# Patient Record
Sex: MALE | Race: WHITE
[De-identification: names, ages, dates, MRNs, and addresses within clinical notes are randomized per-mention and may not be internally consistent; named-entity substitution may affect disease eponyms.]

---

## 2018-10-05 ENCOUNTER — HOSPITAL ENCOUNTER (OUTPATIENT)
Dept: HOSPITAL 62 - OROUT | Age: 28
Discharge: HOME | End: 2018-10-05
Attending: ORTHOPAEDIC SURGERY
Payer: OTHER GOVERNMENT

## 2018-10-05 VITALS — DIASTOLIC BLOOD PRESSURE: 75 MMHG | SYSTOLIC BLOOD PRESSURE: 118 MMHG
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
## 2018-10-05 DIAGNOSIS — S64.497A: ICD-10-CM

## 2018-10-05 DIAGNOSIS — S61.217A: ICD-10-CM

## 2018-10-05 DIAGNOSIS — S66.126A: Primary | ICD-10-CM

## 2018-10-05 DIAGNOSIS — M79.645: ICD-10-CM
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
## 2018-10-05 PROCEDURE — 26356 REPAIR FINGER/HAND TENDON: CPT

## 2018-10-05 PROCEDURE — C9250 ARTISS FIBRIN SEALANT: HCPCS

## 2018-10-05 PROCEDURE — 64831 REPAIR OF DIGIT NERVE: CPT

## 2018-10-05 PROCEDURE — C1769 GUIDE WIRE: HCPCS

## 2018-10-05 PROCEDURE — C1713 ANCHOR/SCREW BN/BN,TIS/BN: HCPCS

## 2018-10-05 NOTE — OPERATIVE REPORT
Operative Report


DATE OF SURGERY: 10/05/18


PREOPERATIVE DIAGNOSIS: Left small finger zone II flexor tendon laceration, 

ulnar digital nerve laceration


POSTOPERATIVE DIAGNOSIS: Left small finger zone I/II flexor tendon laceration, 

ulnar digital nerve laceration, skin loss along the PIP joint


OPERATION: 1.  Zone I/II FDP repair left small finger.  2.  Ulnar digital nerve 

repair left small finger.  3.  Complex closure less than 3 cm with skin 

advancement


SURGEON: YAMIL HENDRICKSON


ANESTHESIA: LMAC


COMPLICATIONS: 





None


ESTIMATED BLOOD LOSS: Minimal


PROCEDURE: 





Indication for above procedure:





28-year-old male who sustained a injury to his left small finger while he was 

evacuated.  Patient was seen outside emergency room where the area was 

irrigated and loosely closed.  He was then sent to me for further evaluation 

and treatment.  Patient findings on examination of digital nerve injury along 

with flexor tendon injury.  Postoperative expectations, prognosis and 

rehabilitation explained to the patient after discussing risks and benefits of 

the surgical procedure patient verbalized understanding consented for the 

procedure.





Procedure In Detail:





Patient was seen and evaluated in the preoperative holding area.  The LEFT 

upper extremity was initialized and marked.  In the preoperative holding area 

the left upper extremity at the fifth digit was prepped with alcohol and a 

digital block was performed injecting 1% lidocaine with epinephrine 1: 100,000 

patient tolerated procedure well.  Patient received 2g of Ancef IV for 

bacterial prophylaxis.  Patient was taken back to the operative room where 

transferred to the operative table and placed under general anesthesia.  Once 

they were adequately anesthetized a nonsterile tourniquet was placed on the 

upper extremity.  A surgical team debriefing was performed ensuring all 

instrumentation was available, the surgical procedure was discussed with 

possible concerns reviewed.  Additional injection was performed to the MP, PIP 

and DIP joints with 1% lidocaine with epinephrine 1: 100,002 cc at the PIP and 

MP joints and 1 cc at the DIP joint.  The upper extremity was prepped with 

Betadine and draped in a sterile fashion.   A timeout was done identifying 

correct patient, procedure and extremity everyone in attendance agree with this 

and verbalized no concerns. 





Brunner's skin incision was made beginning at the A1 pulley extending along the 

DIP joint.  There was significant necrosis of the skin along the proximal 

phalanx leaving the area of skin defect with foul odor and a small amount of 

purulent drainage.  Blunt dissection was performed.  The area was copiously 

irrigated with normal saline.  Exploration of the digit demonstrated intact 

radial neurovascular bundle but disruption of the ulnar digital nerve with a 30 

mm defect.  There is also laceration of the FDS and FDP with scarring of the 

distal aspect of the A2 pulley and complete loss of the A4 pulley in the volar 

plate along the DIP joint.  The laceration of the tendon was in a oblique 

manner resulting in deficient tendon within zone I.





The proximal aspect of the FDP was fed through the pulley sheaths avoiding 

manipulation of the distal aspect to optimize healing.  It was brought out to 

the level consistent with the transition point between zone I and II.  The 

tendon was reapproximated with a M-Sanchez repair technique utilizing 4-0 fiber 

loop suture.  It was then reinforced with a second core suture utilizing 4-0 

fiber loop completing a 6 strand suture repair.  During placement the core 

sutures the tendon was marked at 1 cm to ensure adequate fixation.  Patient was 

awoken from anesthesia and was able to make a full composite fist and full 

extension of the digit without evidence of gapping.  The repair site was then 

reapproximated with 6-0 Prolene epitendinous suture.  Despite the fact there 

was no evidence of gapping there was poor definition tissue within zone 1 thus 

I placed a running Krakw suture proximally and distally this was fed through 

the distal aspect of the nail plate and tied over felt ensuring optimal tension 

to avoid over tensioning the flexor tendon.  This essentially acted as a 

internal brace given the deficient tissue within the distal aspect of the FDP 

but avoid over tension of the flexor tendon.  The wound was then copiously 

irrigated with normal saline.





Under microscope magnification the proximal and distal aspect of the digital 

nerves were identified and debrided back to normal appearing fascicles.  A 2 mm 

x 30 mm Avance nerve graft was opened and placed in saline until adequately 

thawed.  The nerve graft was then reapproximated distally under microscope 

magnification with a epineural 9-0 nylon suture and reinforced with Tisseel 

fibrin glue.  The proximal aspect was then repaired with an epineural 9-0 nylon 

suture reinforced with fibrin glue.  There was no tension on the nerve graft 

throughout range of motion.  The wound was once again irrigated with normal 

saline.





Any nonviable skin along the injury site was then debrided leaving a defect.  

Thus the incision was extended more proximally and undermined so a proximal 

local advancement flap allowed for adequate closure of the skin defect which 

measured approximately 10 mm after debridement.  This was first reapproximated 

with 4-0 nylon suture.  The remaining skin was then closed with 4-0 nylon 

suture throughout passive and active motion there was no evidence of tension at 

the graft site.  Patient had normal capillary refill and skin turgor at the 

completion of the case.





Wound was dressed with Xeroform 4 x 4's and patient was placed in a dorsal 

blocking splint with the IP joints in neutral extension MP joints at 45 degrees 

extension and wrist at 25 degrees of extension.  Sponge counts, instrument 

counts, needle counts counts were correct.  Patient was then awoken from 

anesthesia.  Transferred from the operating room table to the operating room 

stretcher.  There was no intraoperative complications patient tolerated 

procedure well stable to PACU.





Postoperative plan





Patient will begin occupational therapy within 5-7 days as per [place]'s 

flexor tendon repair protocol.  We will proceed with suture removal along the 

nail plate approximately 4 weeks postoperatively or sooner if patient notices 

inability to passively extend.

## 2018-10-05 NOTE — DISCHARGE SUMMARY
Discharge Summary (SDC)





- Discharge


Final Diagnosis: 





Left small finger flexor tendon laceration, ulnar digital nerve laceration


Date of Surgery: 10/05/18


Discharge Date: 10/05/18


Condition: Good


Treatment or Instructions: 











Schedule Follow Up w/ Dr. Emmett Villarreal @ [facility] for Surgery to be seen 

in 10-14 days or as scheduled


[place]: [phone] 


[place]: [phone]


[place]: [phone] 





Ice and elevate





Keep splint clean/dry/intact.





If your fingers become numb please unwrap the Ace wrap but leave the splint in 

place, if the sensation does not return within 30 minutes please return to the 

emergency department.





Please use ibuprofen (Motrin or Advil) 600-800 mg every 8 hours as needed for 

pain or fever DO NOT TAKE w/ TORADOL may use once TORADOL complete.  You may 

also use acetaminophen (Tylenol) 1000 mg every 4-6 hours as needed for pain or 

fever.  Please be aware that many medications contain acetaminophen, do not 

exceed a total of 1000 mg of acetaminophen every 6 hours.  





If ibuprofen and acetaminophen are not sufficient for your pain you may take 

the Percocet/Norco.  Please be aware that the Percocet/Norco does contain 

Tylenol.





Stool softener of choice when on pain medication.


Prescriptions: 


Ketorolac Tromethamine [Toradol 10 mg Tablet] 10 mg PO Q8HP PRN #12 tablet


 PRN Reason: 


Oxycodone HCl/Acetaminophen [Percocet 5-325 mg Tablet] 1 - 2 tab PO Q6 #25 

tablet


Discharge Diet: As Tolerated


Respiratory Treatments at Home: Deep Breathing/Coughing


Discharge Activity: No Lifting Over 10 Pounds, No Lifting/Push/Pulling


Report the Following to Your Physician Immediately: Fever over 101 Degrees, 

Unusual Bleeding, Redness, Swelling, Warmth, Increased Soreness

## 2018-11-30 ENCOUNTER — HOSPITAL ENCOUNTER (OUTPATIENT)
Dept: HOSPITAL 62 - OROUT | Age: 28
Discharge: HOME | End: 2018-11-30
Attending: ORTHOPAEDIC SURGERY
Payer: OTHER GOVERNMENT

## 2018-11-30 VITALS — DIASTOLIC BLOOD PRESSURE: 65 MMHG | SYSTOLIC BLOOD PRESSURE: 111 MMHG

## 2018-11-30 DIAGNOSIS — S62.637A: Primary | ICD-10-CM
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
## 2018-11-30 LAB
ANION GAP SERPL CALC-SCNC: 15 MMOL/L (ref 5–19)
APPEARANCE UR: CLEAR
APTT PPP: YELLOW S
BILIRUB UR QL STRIP: NEGATIVE
BUN SERPL-MCNC: 13 MG/DL (ref 7–20)
CALCIUM: 10 MG/DL (ref 8.4–10.2)
CHLORIDE SERPL-SCNC: 103 MMOL/L (ref 98–107)
CO2 SERPL-SCNC: 25 MMOL/L (ref 22–30)
ERYTHROCYTE [DISTWIDTH] IN BLOOD BY AUTOMATED COUNT: 12.7 % (ref 11.5–14)
GLUCOSE SERPL-MCNC: 85 MG/DL (ref 75–110)
GLUCOSE UR STRIP-MCNC: NEGATIVE MG/DL
HCT VFR BLD CALC: 37.7 % (ref 37.9–51)
HGB BLD-MCNC: 13.1 G/DL (ref 13.5–17)
KETONES UR STRIP-MCNC: NEGATIVE MG/DL
MCH RBC QN AUTO: 30.2 PG (ref 27–33.4)
MCHC RBC AUTO-ENTMCNC: 34.7 G/DL (ref 32–36)
MCV RBC AUTO: 87 FL (ref 80–97)
NITRITE UR QL STRIP: NEGATIVE
PH UR STRIP: 5 [PH] (ref 5–9)
PLATELET # BLD: 263 10^3/UL (ref 150–450)
POTASSIUM SERPL-SCNC: 4.4 MMOL/L (ref 3.6–5)
PROT UR STRIP-MCNC: NEGATIVE MG/DL
RBC # BLD AUTO: 4.33 10^6/UL (ref 4.35–5.55)
SODIUM SERPL-SCNC: 142.9 MMOL/L (ref 137–145)
SP GR UR STRIP: 1.02
UROBILINOGEN UR-MCNC: NEGATIVE MG/DL (ref ?–2)
WBC # BLD AUTO: 6.1 10^3/UL (ref 4–10.5)

## 2018-11-30 PROCEDURE — 0PSV04Z REPOSITION LEFT FINGER PHALANX WITH INTERNAL FIXATION DEVICE, OPEN APPROACH: ICD-10-PCS | Performed by: ORTHOPAEDIC SURGERY

## 2018-11-30 PROCEDURE — 81001 URINALYSIS AUTO W/SCOPE: CPT

## 2018-11-30 PROCEDURE — 36415 COLL VENOUS BLD VENIPUNCTURE: CPT

## 2018-11-30 PROCEDURE — C1713 ANCHOR/SCREW BN/BN,TIS/BN: HCPCS

## 2018-11-30 PROCEDURE — 26765 TREAT FINGER FRACTURE EACH: CPT

## 2018-11-30 PROCEDURE — 73140 X-RAY EXAM OF FINGER(S): CPT

## 2018-11-30 PROCEDURE — 80048 BASIC METABOLIC PNL TOTAL CA: CPT

## 2018-11-30 PROCEDURE — 85027 COMPLETE CBC AUTOMATED: CPT

## 2018-11-30 PROCEDURE — 01820 ANES CLSD PX RDS U/W/H BONES: CPT

## 2018-11-30 NOTE — DISCHARGE SUMMARY
Discharge Summary (SDC)





- Discharge


Final Diagnosis: 





LEFT small finger distal phalanx fracture


Date of Surgery: 11/30/18


Discharge Date: 11/30/18


Condition: Good


Prescriptions: 


Oxycodone HCl/Acetaminophen [Percocet 5-325 mg Tablet] 1 tab PO Q6 #15 tab


Discharge Diet: As Tolerated


Respiratory Treatments at Home: Deep Breathing/Coughing


Discharge Activity: No Lifting Over 10 Pounds, No Lifting/Push/Pulling


Report the Following to Your Physician Immediately: Fever over 101 Degrees, 

Unusual Bleeding, Redness, Swelling, Warmth

## 2018-11-30 NOTE — RADIOLOGY REPORT (SQ)
EXAM DESCRIPTION:  NO CHG FLUORO; FINGER LEFT



COMPLETED DATE/TIME:  11/30/2018 6:40 pm



REASON FOR STUDY:  PERC PINNING 5TH DIGIT



COMPARISON:  None.



FLUOROSCOPY TIME:  35 seconds

2 Images saved to PACS



LIMITATIONS:  None.



PROCEDURE:  Percutaneous pinning of the 5th distal phalanx.



FINDINGS:  Images document the pinning of the 5th distal phalanx.



IMPRESSION:  Percutaneous pinning of the 5th distal phalanx.  Refer to operative note for further inf
ormation.



COMMENT:  PQRS [id number]:  Fluoroscopy time of the procedure is documented in the report.



TECHNICAL DOCUMENTATION:  JOB ID:  [id number]

 2011 [employer]- All Rights Reserved



Reading location - IP/workstation name: [email or web address]

## 2018-11-30 NOTE — OPERATIVE REPORT
Operative Report


DATE OF SURGERY: 11/30/18


PREOPERATIVE DIAGNOSIS: Distal phalanx fracture left small finger


POSTOPERATIVE DIAGNOSIS: Same


OPERATION: Same


SURGEON: YAMIL HENDRICKSON


ANESTHESIA: LMAC


COMPLICATIONS: 





None


ESTIMATED BLOOD LOSS: Minimal


PROCEDURE: 





Indication for above procedure:





28-year-old male who sustained injury to his left small finger and underwent 

flexor tendon repair.  Unfortunately during physical therapy during passive 

range of motion he sustained a fracture of his distal phalanx through the 

transosseous sutures.  Patient was seen in my office which point we attempted 

conservative treatment but patient continued to have instability thus decision 

was made to proceed with operative intervention.





Procedure In Detail:





Patient was seen and evaluated in the preoperative holding area.  The LEFT 

upper extremity was initialized and marked.  Patient received 2g of Ancef IV 

for bacterial prophylaxis.  Patient was taken back to the operative room where 

transferred to the operative table and placed under general anesthesia.  Once 

they were adequately anesthetized a surgical team debriefing was performed 

ensuring all instrumentation was available, the surgical procedure was 

discussed with possible concerns reviewed.  Local block was performed with 6 cc 

of 1% lidocaine without epinephrine.  The upper extremity was prepped with 

chlorhexidine and alcohol and draped in a sterile fashion.  





Gentle passive motion was performed along the DIP, PIP and MCP joint obtaining 

full passive motion.  The distal phalanx was then reduced a 0.045 K wire was 

placed obliquely across the fracture site ending just proximal to the articular 

surface.  A second K wire 0.035 was placed obliquely to provide rotational 

stability.  Both pins were then cut below the skin.  There was notable 

stability of the fracture after pinning and no fracture motion with DIP joint 

passive flexion.  Wound was dressed with Xeroform 4 x 4's and a loosely applied 

[place].

## 2018-11-30 NOTE — RADIOLOGY REPORT (SQ)
EXAM DESCRIPTION:  NO CHG FLUORO; FINGER LEFT



COMPLETED DATE/TIME:  11/30/2018 6:40 pm



REASON FOR STUDY:  PERC PINNING 5TH DIGIT



COMPARISON:  None.



FLUOROSCOPY TIME:  35 seconds

2 Images saved to PACS



LIMITATIONS:  None.



PROCEDURE:  Percutaneous pinning of the 5th distal phalanx.



FINDINGS:  Images document the pinning of the 5th distal phalanx.



IMPRESSION:  Percutaneous pinning of the 5th distal phalanx.  Refer to operative note for further inf
ormation.



COMMENT:  PQRS [id number]:  Fluoroscopy time of the procedure is documented in the report.



TECHNICAL DOCUMENTATION:  JOB ID:  [id number]

 2011 [employer]- All Rights Reserved



Reading location - IP/workstation name: [email or web address]

## 2019-03-08 LAB
ANION GAP SERPL CALC-SCNC: 11 MMOL/L (ref 5–19)
APPEARANCE UR: CLEAR
APTT PPP: YELLOW S
BILIRUB UR QL STRIP: NEGATIVE
BUN SERPL-MCNC: 18 MG/DL (ref 7–20)
CALCIUM: 10.3 MG/DL (ref 8.4–10.2)
CHLORIDE SERPL-SCNC: 103 MMOL/L (ref 98–107)
CO2 SERPL-SCNC: 30 MMOL/L (ref 22–30)
ERYTHROCYTE [DISTWIDTH] IN BLOOD BY AUTOMATED COUNT: 13.2 % (ref 11.5–14)
GLUCOSE SERPL-MCNC: 97 MG/DL (ref 75–110)
GLUCOSE UR STRIP-MCNC: NEGATIVE MG/DL
HCT VFR BLD CALC: 42.3 % (ref 37.9–51)
HGB BLD-MCNC: 14.6 G/DL (ref 13.5–17)
KETONES UR STRIP-MCNC: NEGATIVE MG/DL
MCH RBC QN AUTO: 30.6 PG (ref 27–33.4)
MCHC RBC AUTO-ENTMCNC: 34.5 G/DL (ref 32–36)
MCV RBC AUTO: 89 FL (ref 80–97)
NITRITE UR QL STRIP: NEGATIVE
PH UR STRIP: 5 [PH] (ref 5–9)
PLATELET # BLD: 248 10^3/UL (ref 150–450)
POTASSIUM SERPL-SCNC: 4.7 MMOL/L (ref 3.6–5)
PROT UR STRIP-MCNC: NEGATIVE MG/DL
RBC # BLD AUTO: 4.77 10^6/UL (ref 4.35–5.55)
SODIUM SERPL-SCNC: 144.3 MMOL/L (ref 137–145)
SP GR UR STRIP: 1.02
UROBILINOGEN UR-MCNC: NEGATIVE MG/DL (ref ?–2)
WBC # BLD AUTO: 5.1 10^3/UL (ref 4–10.5)

## 2019-03-08 NOTE — RADIOLOGY REPORT (SQ)
EXAM DESCRIPTION:  CHEST PA/LATERAL



COMPLETED DATE/TIME:  3/8/2019 10:33 am



REASON FOR STUDY:  PRE-OP



COMPARISON:  None.



EXAM PARAMETERS:  NUMBER OF VIEWS: two views

TECHNIQUE: Digital Frontal and Lateral radiographic views of the chest acquired.

RADIATION DOSE: NA

LIMITATIONS: none



FINDINGS:  LUNGS AND PLEURA: No opacities, masses or pneumothorax. No pleural effusion.

MEDIASTINUM AND HILAR STRUCTURES: No masses or contour abnormalities.

HEART AND VASCULAR STRUCTURES: Heart normal size.  No evidence for failure.

BONES: No acute findings.

HARDWARE: None in the chest.

OTHER: No other significant finding.



IMPRESSION:  NO SIGNIFICANT RADIOGRAPHIC FINDING IN THE CHEST.



TECHNICAL DOCUMENTATION:  JOB ID:  [id number]

 2011 [employer]- All Rights Reserved



Reading location - IP/workstation name: [email or web address]

## 2019-03-15 ENCOUNTER — HOSPITAL ENCOUNTER (OUTPATIENT)
Dept: HOSPITAL 62 - OROUT | Age: 29
Discharge: HOME | End: 2019-03-15
Attending: ORTHOPAEDIC SURGERY
Payer: OTHER GOVERNMENT

## 2019-03-15 VITALS — SYSTOLIC BLOOD PRESSURE: 117 MMHG | DIASTOLIC BLOOD PRESSURE: 72 MMHG

## 2019-03-15 DIAGNOSIS — T84.84XA: ICD-10-CM
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
## 2019-03-15 DIAGNOSIS — M65.842: ICD-10-CM

## 2019-03-15 DIAGNOSIS — M24.542: Primary | ICD-10-CM

## 2019-03-15 PROCEDURE — 20680 REMOVAL OF IMPLANT DEEP: CPT

## 2019-03-15 PROCEDURE — 26440 RELEASE PALM/FINGER TENDON: CPT

## 2019-03-15 PROCEDURE — 85027 COMPLETE CBC AUTOMATED: CPT

## 2019-03-15 PROCEDURE — 93010 ELECTROCARDIOGRAM REPORT: CPT

## 2019-03-15 PROCEDURE — 36415 COLL VENOUS BLD VENIPUNCTURE: CPT

## 2019-03-15 PROCEDURE — 80048 BASIC METABOLIC PNL TOTAL CA: CPT

## 2019-03-15 PROCEDURE — 73140 X-RAY EXAM OF FINGER(S): CPT

## 2019-03-15 PROCEDURE — 81001 URINALYSIS AUTO W/SCOPE: CPT

## 2019-03-15 PROCEDURE — 71046 X-RAY EXAM CHEST 2 VIEWS: CPT

## 2019-03-15 PROCEDURE — 01810 ANES PX NRV MUSC F/ARM WRST: CPT

## 2019-03-15 PROCEDURE — 93005 ELECTROCARDIOGRAM TRACING: CPT

## 2019-03-15 PROCEDURE — 26525 RELEASE FINGER CONTRACTURE: CPT

## 2019-03-15 RX ADMIN — FENTANYL CITRATE PRN MCG: 50 INJECTION INTRAMUSCULAR; INTRAVENOUS at 14:07

## 2019-03-15 RX ADMIN — FENTANYL CITRATE PRN MCG: 50 INJECTION INTRAMUSCULAR; INTRAVENOUS at 14:00

## 2019-03-15 NOTE — DISCHARGE SUMMARY
Discharge Summary (SDC)





- Discharge


Final Diagnosis: 





Left small finger PIP joint flexion contracture with flexion adhesions


Date of Surgery: 03/15/19


Discharge Date: 03/15/19


Condition: Good


Treatment or Instructions: 


Schedule Follow Up w/ Dr. Emmett Villarreal @ [facility] for Surgery to be seen 

in 10-14 days or as scheduled





[place]: [phone] 


[place]: [phone]


[place]: [phone] 





May remove dressing on postop day #3, keep incision covered and dry.





Ice and elevate





May begin finger range of motion attempting to make full fist.





Stool softener of choice when on pain medication.





USE OF OVER-THE-COUNTER IBUPROFEN:


     Ibuprofen (Advil, Nuprin, Medipren, Motrin IB) is a medication for fever 

and pain control.  In addition, it has anti- inflammatory effects which may be 

beneficial, especially in the treatment of injuries.


     It's best to take ibuprofen with food.  Persons with ulcer disease or 

allergy to aspirin should notify their physician of this before taking ibuprofe

n.


     Ibuprofen can be given every four to six hours, for a total of four doses 

daily.


     Age              Pain or fever dose          Antiinflammatory dose


     6-8 yr              200 mg (1 tab)                200 mg (1 tab)


     9-11 yr             200 mg (1 tab)                200-400 mg (1-2 tab)


     11-14 yr            200-400 mg (1-2 tab)         400 mg (2 tab)


     15-adult            400 mg (2 tab)                600 mg (3 tab)








ORAL NARCOTIC MEDICATION:


     You have been given a prescription for pain control.  This medication is a 

narcotic.  It's best taken with food, as nausea can result if taken on an empty 

stomach.


     Don't operate machinery or drive within six hours of taking this 

medication.  Do not combine this medicine with alcohol, or with any medication 

which can cause sedation (such as cold tablets or sleeping pills) unless you get

permission from the physician.


     Narcotics tend to cause constipation.  If possible, drink plenty of fluids 

and eat a diet high in fiber and fruits.





     Please be aware that prescription narcotics also have the potential for 

abuse.  People become addicted to these medications because of the general sense

of wellbeing that they induce.  This feeling along with a significant reduction 

in tension, anxiety, and aggression provides a stimulating seductive quality to 

these drugs.  Once your pain is under control, we encourage you to discard your 

unused narcotics.





Prescriptions: 


Ketorolac Tromethamine [Toradol 10 mg Tablet] 10 mg PO Q8HP PRN #12 tablet


 PRN Reason: 


Oxycodone HCl/Acetaminophen [Percocet 5-325 mg Tablet] 1 tab PO Q6 PRN #25 tab


 PRN Reason: 


Discharge Diet: As Tolerated


Respiratory Treatments at Home: Deep Breathing/Coughing


Discharge Activity: No Lifting Over 10 Pounds, No Lifting/Push/Pulling


Report the Following to Your Physician Immediately: Fever over 101 Degrees, Un

usual Bleeding, Redness, Swelling, Warmth, Increased Soreness

## 2019-03-15 NOTE — RADIOLOGY REPORT (SQ)
EXAM DESCRIPTION:  NO CHG FLUORO; FINGER LEFT



COMPLETED DATE/TIME:  3/15/2019 2:20 pm



REASON FOR STUDY:  HARDWARE REMOVAL LEFT 5TH / PINKY FINGER ASST W/ FLUORO IN OR S62.637A  DISP FX OF
 DISTAL PHALANX OF LEFT LITTLE FINGER, IN S66.126A  LACERAT FLXR MUSC/FASC/TEND R LIT FNGR AT WRS/HND
 L M24.542  CONTRACTURE, LEFT HAND



COMPARISON:  None.



FLUOROSCOPY TIME:  4 seconds

3 images saved to PACS.



TECHNIQUE:  Intra-operative images acquired during surgical procedure to evaluate progress.

NUMBER OF IMAGES: 3



LIMITATIONS:  None.



FINDINGS:  Images mild 5th distal phalanx with removal of orthopedic pins.



IMPRESSION:  IMAGE(S) OBTAINED DURING PROCEDURE.



COMMENT:  Quality :  Final reports for procedures using fluoroscopy that document radiation exp
osure indices, or exposure time and number of fluorographic images (if radiation exposure indices are
 not available)

Please consult full operative report of the attending physician for description of the procedure.



TECHNICAL DOCUMENTATION:  JOB ID:  [id number]

 2011 [employer]- All Rights Reserved



Reading location - IP/workstation name: [email or web address]

## 2019-03-15 NOTE — OPERATIVE REPORT
Operative Report


DATE OF SURGERY: 03/15/19


PREOPERATIVE DIAGNOSIS: Flexor adhesions left small finger, PIP joint flexion c

ontracture retained hardware distal phalanx


POSTOPERATIVE DIAGNOSIS: Same


OPERATION: Flexor tenolysis left small finger with PIP joint capsulotomy, ha

rdware removal


ANESTHESIA: LMAC


COMPLICATIONS: 





None


ESTIMATED BLOOD LOSS: Minimal


PROCEDURE: 





Indication for above procedure:





29-year-old male who sustained laceration to his small finger underwent flexor 

tendon repair unfortunately he then developed a distal phalanx fracture and 

underwent percutaneous pinning.  Throughout his rehabilitation he has continued 

to demonstrate worsening flexion contracture the PIP joint at that point after 

exhausting occupational therapy decision was made to proceed with operative 

treatment.  Postoperative expectations outcomes and rehabilitation were 

explained patient verbalized understanding consented for the surgical procedure.





Procedure In Detail:





Patient was seen and evaluated in the preoperative holding area.  The LEFT upper

extremity was initialized and marked.  Patient received 2g of Ancef IV for 

bacterial prophylaxis.  Patient was taken back to the operative room where 

transferred to the operative table.  Once they were adequately anesthetized a 

nonsterile tourniquet was placed on the upper extremity.  A surgical team 

debriefing was performed ensuring all instrumentation was available, the 

surgical procedure was discussed with possible concerns reviewed.  A digital 

block was performed utilizing 10 mL of  1% lidocaine without epinephrine.  The 

upper extremity was prepped with chlorhexidine and alcohol and draped in a 

sterile fashion.   A timeout was done identifying correct patient, procedure and

extremity everyone in attendance agree with this and verbalized no concerns. The

extremity was exsanguinated the tourniquet was inflated to 250 mmHg.





Previous K wire within the distal phalanx was isolated and removed.  C arm was 

obtained confirming healing of the distal phalanx fracture.





Mid lateral skin incision was utilized along the ulnar aspect.  Blunt dissection

was performed.  Digital nerve repair was identified and remained intact.  There 

was significant scarring of the skin to the overlying pulley sheath throughout 

the incision and proximally.  Sharp dissection was performed overlying the A2 

pulley to ensure preservation.  Once the pulley was completely identified along 

with the ulnar neurovascular bundle the radial neurovascular bundle was 

identified.  With a tenolysis knife the FDP tendon was freed proximally.  The 

radial slip of the FDS remained intact and was also tenolysed proximally from 

the FDP.  FDP  repair at the DIP joint was healed.  Retracting the FDS and FDP 

tendon the volar plate was at the PIP joint was released along with the radial 

and ulnar collateral ligaments.  With a gentle passive extension maneuver full 

passive PIP joint extension was achieved.  At the DIP joint the collateral 

ligaments were also released to passively extend the digit to approximately 25 

degrees of flexion.





Tourniquet was deflated any peripheral bleeding was controlled with bipolar 

cautery.  Patient was awoken from anesthesia was able to fully actively flex the

DIP and PIP joints to distal palmar crease.  Residual PIP joint flexion 

contracture of approximately 20 degrees but given the tenuous skin decision was 

made to leave residual 20 degree flexion contracture.





Wound was then copiously irrigated with normal saline.  Skin was closed with 

interrupted 4-0 nylon suture.  A transverse split within the digit secondary to 

improved extension was closed as well with 4-0 nylon suture.  Wound was dressed 

with Xeroform 4 x 4's and patient was placed in a ulnar gutter splint 

maintaining full extension of the small finger.





Sponge counts, instrument counts, needle counts counts were correct.  Patient 

was then awoken from anesthesia.  Transferred from the operating room table to 

the operating room stretcher.  There was no intraoperative complications patient

tolerated procedure well stable to PACU.








Postoperative plan:





Patient will follow-up as scheduled for wound check.  Patient will begin 

occupational therapy within 72 hours